# Patient Record
(demographics unavailable — no encounter records)

---

## 2017-12-07 NOTE — MMO
BILATERAL SCREENING MAMMOGRAM:

 

Date:  11/14/17 

 

INDICATION:

Annual exam. 

 

COMPARISON:  

None available. Outside mammograms were not received and are unavailable for comparison. 

 

FINDINGS:

 

This will serve as the patient's new baseline examination at [facility]. 

 

Interpretation of this exam was assisted with computer-aided detection.  

 

There is a 1.5 cm spiculated mass seen within the left breast 9 o'clock position, posterior depth. Th
ere is a focal region of architectural distortion seen within the right breast 9 o'clock position, an
terior depth. 

 

There are benign scattered densities bilaterally. There are benign-appearing calcifications bilateral
ly. There are small intramammary lymph nodes seen within the upper outer aspects of both breasts. 

 

IMPRESSION: 

 

BIRADS 0:  Incomplete:  Need Additional Imaging Evaluation and/or Prior Mammograms for Comparison

 

There is a spiculated mass within the left breast 9 o'clock position, posterior depth, measuring 1.5 
cm, that is suspicious. Recommend additional spot magnification compression view in the CC and MLO pr
ojection. One ML projection is also recommended of the left breast. Ultrasound will likely be necessa
ry for additional characterization. 

 

There is a focal area of architectural distortion seen within the right breast 9 o'clock position, an
terior depth. There are numerous surrounding benign-appearing densities throughout the right breast. 
This is adjacent to this area of architectural distortion and may reflect adjacent cysts. Spot magnif
ication compression views in the CC and MLO projection within this location is recommended. A LM proj
ection of the right breast would be helpful. Ultrasound may be necessary for further evaluation. 

 

The facility will notify the patient of the need for additional imaging services. 

 

 

POS: DIANNA